# Patient Record
Sex: FEMALE | Race: WHITE | NOT HISPANIC OR LATINO | Employment: UNEMPLOYED | URBAN - METROPOLITAN AREA
[De-identification: names, ages, dates, MRNs, and addresses within clinical notes are randomized per-mention and may not be internally consistent; named-entity substitution may affect disease eponyms.]

---

## 2024-05-24 ENCOUNTER — TELEMEDICINE (OUTPATIENT)
Dept: FAMILY MEDICINE CLINIC | Facility: TELEHEALTH | Age: 39
End: 2024-05-24
Payer: COMMERCIAL

## 2024-05-24 DIAGNOSIS — K04.7 DENTAL ABSCESS: Primary | ICD-10-CM

## 2024-05-24 RX ORDER — IBUPROFEN 600 MG/1
600 TABLET ORAL EVERY 6 HOURS PRN
Qty: 15 TABLET | Refills: 0 | Status: SHIPPED | OUTPATIENT
Start: 2024-05-24 | End: 2024-05-29

## 2024-05-24 RX ORDER — AMOXICILLIN 500 MG/1
500 CAPSULE ORAL 3 TIMES DAILY
Qty: 21 CAPSULE | Refills: 0 | Status: SHIPPED | OUTPATIENT
Start: 2024-05-24 | End: 2024-05-31

## 2024-05-24 NOTE — PROGRESS NOTES
You have chosen to receive care through a telehealth visit.  Do you consent to use a video/audio connection for your medical care today? Yes     CHIEF COMPLAINT  Chief Complaint   Patient presents with    Dental Pain         HPI  Halley Stubbs is a 38 y.o. female  presents with complaint of throat is sore due to bad tothache.  Reports her symptoms started yesterday. No fever or chills. No nausea or vomiting. No CP or SOA. No swelling in face. Reports her tooth has cavities. Reports she has used oragel that helped alittle.     Review of Systems   Constitutional:  Negative for chills, fatigue and fever.   HENT:  Positive for dental problem and sore throat. Negative for congestion, ear discharge, ear pain, sinus pressure and sinus pain.    Respiratory:  Negative for cough, chest tightness, shortness of breath and wheezing.    Cardiovascular:  Negative for chest pain.   Gastrointestinal:  Negative for abdominal pain, diarrhea, nausea and vomiting.   Musculoskeletal:  Negative for back pain and myalgias.   Neurological:  Negative for dizziness and headaches.   Psychiatric/Behavioral: Negative.         History reviewed. No pertinent past medical history.    History reviewed. No pertinent family history.    Social History     Socioeconomic History    Marital status:    Tobacco Use    Smoking status: Former     Types: Cigarettes    Smokeless tobacco: Current   Vaping Use    Vaping status: Never Used       Halley Stubbs  reports that she has quit smoking. Her smoking use included cigarettes. She uses smokeless tobacco. I have educated her on the risk of diseases from using tobacco products such as cancer, COPD, and heart disease.     I advised her to quit and she is not willing to quit.    I spent  1  minutes counseling the patient.              LMP 05/17/2024   Breastfeeding No     PHYSICAL EXAM  Physical Exam   Constitutional: She is oriented to person, place, and time. She appears  well-developed and well-nourished. No distress.   HENT:   Head: Normocephalic and atraumatic.   Right Ear: Hearing normal.   Left Ear: Hearing normal.   Nose: No congestion. Right sinus exhibits no maxillary sinus tenderness. Left sinus exhibits no maxillary sinus tenderness.   Mouth/Throat: Mouth/Lips are normal.Dental abscesses and dental caries present.       Small abscess to gum    Eyes: Conjunctivae and lids are normal.   Pulmonary/Chest: Effort normal.  No respiratory distress.  Neurological: She is alert and oriented to person, place, and time.   Psychiatric: She has a normal mood and affect. Her speech is normal and behavior is normal.       No results found for this or any previous visit.    Diagnoses and all orders for this visit:    1. Dental abscess (Primary)    Other orders  -     amoxicillin (AMOXIL) 500 MG capsule; Take 1 capsule by mouth 3 (Three) Times a Day for 7 days.  Dispense: 21 capsule; Refill: 0  -     ibuprofen (ADVIL,MOTRIN) 600 MG tablet; Take 1 tablet by mouth Every 6 (Six) Hours As Needed for Moderate Pain for up to 5 days.  Dispense: 15 tablet; Refill: 0    Oragel toothache rinse  Call dentist this am for appt   ER for any worsening symptoms such as high fever, worsening pain, swelling in face or trouble breathing       FOLLOW-UP  As discussed during visit with PCP/Virtual Care if no improvement or Urgent Care/Emergency Department if worsening of symptoms    Patient verbalizes understanding of medication dosage, comfort measures, instructions for treatment and follow-up.    ERIN Saba  05/24/2024  05:30 EDT    The use of a video visit has been reviewed with the patient and verbal informed consent has been obtained. Myself and Halley Stubbs participated in this visit. The patient is located in [address] [place], [place] [id number].    I am located in [place], [place]. Mychart and Twilio were utilized. I spent 5 minutes in the patient's chart for this  visit.      Note Disclaimer: At [facility], we believe that sharing information builds trust and better   relationships. You are receiving this note because you recently visited [facility]. It is possible you   will see health information before a provider has talked with you about it. This kind of information can   be easy to misunderstand. To help you fully understand what it means for your health, we urge you to   discuss this note with your provider.

## 2024-05-25 ENCOUNTER — TELEMEDICINE (OUTPATIENT)
Dept: FAMILY MEDICINE CLINIC | Facility: TELEHEALTH | Age: 39
End: 2024-05-25
Payer: COMMERCIAL

## 2024-05-25 DIAGNOSIS — H92.02 ACUTE OTALGIA, LEFT: ICD-10-CM

## 2024-05-25 DIAGNOSIS — K08.89 PAIN, DENTAL: Primary | ICD-10-CM

## 2024-05-25 DIAGNOSIS — J02.9 SORE THROAT: ICD-10-CM

## 2024-05-25 RX ORDER — PREDNISONE 10 MG/1
TABLET ORAL DAILY
Qty: 21 EACH | Refills: 0 | Status: SHIPPED | OUTPATIENT
Start: 2024-05-25 | End: 2024-05-31

## 2024-05-25 NOTE — PROGRESS NOTES
Subjective   Chief Complaint   Patient presents with    Dental Pain           Earache    Sore Throat              Halley Stubbs is a 38 y.o. female.     History of Present Illness  Patient was seen virtually yesterday for left upper dental pain and sore throat.  She was prescribed amoxicillin and ibuprofen.  She states she woke up today with left ear pain in the gum surrounding the left upper tooth is more swollen. Her throat continues to hurt as well.   Dental Pain   This is a new problem. Episode onset: 2 days. The problem occurs constantly. The problem has been unchanged. The pain is moderate. Associated symptoms include facial pain and thermal sensitivity. Pertinent negatives include no difficulty swallowing, fever or oral bleeding.   Earache   There is pain in the left ear. This is a new problem. The current episode started today. The problem has been unchanged. Associated symptoms include a sore throat. Pertinent negatives include no coughing, drainage, ear discharge, headaches, hearing loss, neck pain, rash or rhinorrhea.   Sore Throat   Associated symptoms include ear pain. Pertinent negatives include no congestion, coughing, ear discharge, headaches, hoarse voice or neck pain.        No Known Allergies    History reviewed. No pertinent past medical history.    Past Surgical History:   Procedure Laterality Date    TUBAL ABDOMINAL LIGATION         Social History     Socioeconomic History    Marital status:    Tobacco Use    Smoking status: Former     Types: Cigarettes    Smokeless tobacco: Current   Vaping Use    Vaping status: Never Used       History reviewed. No pertinent family history.      Current Outpatient Medications:     amoxicillin (AMOXIL) 500 MG capsule, Take 1 capsule by mouth 3 (Three) Times a Day for 7 days., Disp: 21 capsule, Rfl: 0    ibuprofen (ADVIL,MOTRIN) 600 MG tablet, Take 1 tablet by mouth Every 6 (Six) Hours As Needed for Moderate Pain for up to 5 days., Disp: 15  tablet, Rfl: 0    predniSONE (DELTASONE) 10 MG (21) dose pack, Take  by mouth Daily for 6 days., Disp: 21 each, Rfl: 0      Review of Systems   Constitutional:  Negative for chills, diaphoresis, fatigue and fever.   HENT:  Positive for ear pain and sore throat. Negative for congestion, ear discharge, hearing loss, hoarse voice, rhinorrhea and tinnitus.    Respiratory:  Negative for cough.    Gastrointestinal: Negative.    Musculoskeletal:  Negative for myalgias and neck pain.   Skin:  Negative for rash.   Neurological:  Negative for dizziness and headache.   Hematological:  Negative for adenopathy.        There were no vitals filed for this visit.    Objective   Physical Exam  Constitutional:       General: She is not in acute distress.     Appearance: Normal appearance. She is not ill-appearing, toxic-appearing or diaphoretic.   HENT:      Head: Normocephalic.      Left Ear: External ear normal. No drainage.      Mouth/Throat:      Lips: Pink.      Mouth: Mucous membranes are moist.      Dentition: Dental tenderness and gingival swelling present.     Pulmonary:      Effort: Pulmonary effort is normal.   Neurological:      Mental Status: She is alert and oriented to person, place, and time.          Procedures     Assessment & Plan   Diagnoses and all orders for this visit:    1. Pain, dental (Primary)  -     predniSONE (DELTASONE) 10 MG (21) dose pack; Take  by mouth Daily for 6 days.  Dispense: 21 each; Refill: 0    2. Acute otalgia, left  -     predniSONE (DELTASONE) 10 MG (21) dose pack; Take  by mouth Daily for 6 days.  Dispense: 21 each; Refill: 0    3. Sore throat  -     predniSONE (DELTASONE) 10 MG (21) dose pack; Take  by mouth Daily for 6 days.  Dispense: 21 each; Refill: 0    Continue previously prescribed antibiotics.  Start prednisone instead of the ibuprofen.  You may take additional Tylenol if needed.  Salt water gargles for dental pain and sore throat.    Follow-up with a dentist as soon as  possible.    If symptoms worsen or do not improve follow up with your PCP or visit your nearest Urgent Care Center or ER.    No results found for this or any previous visit.    PLAN: Discussed dosing, side effects, recommended other symptomatic care.  Patient should follow up with primary care provider, Urgent Care or ER if symptoms worsen, fail to resolve or other symptoms need attention. Patient/family agree to the above.         ERIN Herrera     The use of a video visit has been reviewed with the patient and verbal informed consent has been obtained. Myself and Halley Stubbs participated in this visit. The patient is located at [address] [place] [address]. I am located in [place], [place]. Mychart and Zoom were utilized.        This visit was performed via Telehealth.  This patient has been instructed to follow-up with their primary care provider if their symptoms worsen or the treatment provided does not resolve their illness.

## 2024-05-25 NOTE — PATIENT INSTRUCTIONS
Continue previously prescribed antibiotics.  Start prednisone instead of the ibuprofen.  You may take additional Tylenol if needed.  Salt water gargles for dental pain and sore throat.    Follow-up with a dentist as soon as possible.    If symptoms worsen or do not improve follow up with your PCP or visit your nearest Urgent Care Center or ER.

## 2025-06-25 ENCOUNTER — TELEMEDICINE (OUTPATIENT)
Dept: FAMILY MEDICINE CLINIC | Facility: TELEHEALTH | Age: 40
End: 2025-06-25
Payer: COMMERCIAL

## 2025-06-25 DIAGNOSIS — T36.95XA ANTIBIOTIC-INDUCED YEAST INFECTION: ICD-10-CM

## 2025-06-25 DIAGNOSIS — B37.9 ANTIBIOTIC-INDUCED YEAST INFECTION: ICD-10-CM

## 2025-06-25 DIAGNOSIS — S09.93XA PAIN DUE TO DENTAL TRAUMA: ICD-10-CM

## 2025-06-25 DIAGNOSIS — K04.7 TOOTH ABSCESS: Primary | ICD-10-CM

## 2025-06-25 DIAGNOSIS — R05.1 ACUTE COUGH: ICD-10-CM

## 2025-06-25 RX ORDER — FLUCONAZOLE 150 MG/1
150 TABLET ORAL ONCE
Qty: 1 TABLET | Refills: 0 | Status: SHIPPED | OUTPATIENT
Start: 2025-06-25 | End: 2025-06-25

## 2025-06-25 RX ORDER — GUAIFENESIN AND DEXTROMETHORPHAN HYDROBROMIDE 600; 30 MG/1; MG/1
1 TABLET, EXTENDED RELEASE ORAL 2 TIMES DAILY PRN
Qty: 20 TABLET | Refills: 0 | Status: SHIPPED | OUTPATIENT
Start: 2025-06-25 | End: 2025-07-05

## 2025-06-25 RX ORDER — DEXTROMETHORPHAN HYDROBROMIDE AND PROMETHAZINE HYDROCHLORIDE 15; 6.25 MG/5ML; MG/5ML
5 SYRUP ORAL 4 TIMES DAILY PRN
Qty: 118 ML | Refills: 0 | Status: SHIPPED | OUTPATIENT
Start: 2025-06-25 | End: 2025-07-02

## 2025-06-25 NOTE — PROGRESS NOTES
You have chosen to receive care through a telehealth visit.  Do you consent to use a video/audio connection for your medical care today? Yes     HPI  History of Present Illness  The patient is a 39-year-old female who presents for evaluation of a dry cough and toothache.    She has been experiencing a persistent, non-productive cough, which intensifies when she assumes a supine position. She reports no associated symptoms such as fever, wheezing, or shortness of breath. She also does not have any nasal discharge, allergic reactions, or pharyngitis. She has not previously used Mucinex DM for symptom management. She smokes cigarettes.    She reports severe pain in a fractured front tooth, which she rates as 10 on a scale of 1 to 10. The tooth was chipped approximately a month ago. She has not sought any specific treatment for this condition, but has taken Aleve for pain relief. She is not currently on any regular medications. She is not pregnant or breastfeeding.    SOCIAL HISTORY  She admits to smoking.    Review of Systems   Constitutional: Negative.    HENT:  Positive for dental problem.    Respiratory:  Positive for cough. Negative for shortness of breath, wheezing and stridor.    Cardiovascular: Negative.    Neurological: Negative.    Hematological: Negative.    Psychiatric/Behavioral: Negative.         History reviewed. No pertinent past medical history.    History reviewed. No pertinent family history.    Social History     Socioeconomic History    Marital status:    Tobacco Use    Smoking status: Former     Types: Cigarettes    Smokeless tobacco: Current   Vaping Use    Vaping status: Never Used         There were no vitals taken for this visit.    PHYSICAL EXAM  Physical Exam   Constitutional: She appears well-developed and well-nourished. She does not have a sickly appearance. She does not appear ill. No distress.   HENT:   Head: Normocephalic and atraumatic.   Nose: Nose normal.   Mouth/Throat:  Mouth/Lips are normal.Abnormal dentition. Dental abscesses present.   Pulmonary/Chest: Effort normal.  No respiratory distress. She no audible wheeze...  Neurological: She is alert.   Psychiatric: She has a normal mood and affect.   Vitals reviewed.    Physical Exam  Mouth/Throat: Chipped front tooth noted.    Diagnoses and all orders for this visit:    1. Tooth abscess (Primary)  -     amoxicillin-clavulanate (AUGMENTIN) 875-125 MG per tablet; Take 1 tablet by mouth 2 (Two) Times a Day for 10 days.  Dispense: 20 tablet; Refill: 0    2. Pain due to dental trauma    3. Acute cough  -     guaifenesin-dextromethorphan 600-30 mg (MUCINEX DM)  MG tablet sustained-release 12 hour; Take 1 tablet by mouth 2 (Two) Times a Day As Needed (cough and congestion) for up to 10 days.  Dispense: 20 tablet; Refill: 0  -     promethazine-dextromethorphan (PROMETHAZINE-DM) 6.25-15 MG/5ML syrup; Take 5 mL by mouth 4 (Four) Times a Day As Needed for Cough for up to 7 days.  Dispense: 118 mL; Refill: 0    4. Antibiotic-induced yeast infection  -     fluconazole (Diflucan) 150 MG tablet; Take 1 tablet by mouth 1 (One) Time for 1 dose. As needed for yeast infection  Dispense: 1 tablet; Refill: 0      Assessment & Plan  1. Dry cough.  - Reports a dry, aggravating cough that worsens when lying down.  - No fever, wheezing, or shortness of breath; smokes but has no known allergies.  - Recommended over-the-counter Mucinex DM twice daily with a full glass of water; Phenergan liquid cough syrup prescribed for nighttime use, to be taken every 6 hours as needed for the cough.  - Advised not to drive after taking Phenergan due to its potential to cause drowsiness; prescriptions sent to pharmacy.    2. Toothache.  - Reports a chipped front tooth with severe pain rated at 10/10.  - Has not been able to see a dentist due to insurance issues.  - Augmentin 875 mg prescribed, to be taken twice daily for 7 days to manage any potential infection.  -  Advised to seek dental care and call around to find a dentist that accepts her insurance  - Advil as directed prn pain. Gentle dental hygiene and warm salt water gargles prn.       FOLLOW-UP  As discussed during visit with [facility], if symptoms worsen or fail to improve, follow-up with PCP/Urgent Care/Emergency Department.    Patient verbalizes understanding of medications, instructions for treatment and follow-up.    Patient or patient representative verbalized consent for the use of Ambient Listening during the visit with  ERIN Zuluaga for chart documentation. 6/25/2025  14:33 EDT    ERIN Zuluaga  06/25/2025  14:32 EDT    The use of a video visit has been reviewed with the patient and verbal informed consent has been obtained. Myself and Halley Stubbs participated in this visit. The patient is located in [facility], and I am located in [place], [place]. [employer] and [employer] were utilized.